# Patient Record
Sex: FEMALE | Race: WHITE | NOT HISPANIC OR LATINO | URBAN - NONMETROPOLITAN AREA
[De-identification: names, ages, dates, MRNs, and addresses within clinical notes are randomized per-mention and may not be internally consistent; named-entity substitution may affect disease eponyms.]

---

## 2024-04-23 ENCOUNTER — APPOINTMENT (OUTPATIENT)
Dept: PRIMARY CARE | Facility: CLINIC | Age: 49
End: 2024-04-23
Payer: COMMERCIAL

## 2024-06-04 ENCOUNTER — OFFICE VISIT (OUTPATIENT)
Dept: PRIMARY CARE | Facility: CLINIC | Age: 49
End: 2024-06-04
Payer: COMMERCIAL

## 2024-06-04 VITALS
BODY MASS INDEX: 33.61 KG/M2 | HEIGHT: 60 IN | OXYGEN SATURATION: 96 % | WEIGHT: 171.2 LBS | TEMPERATURE: 98.2 F | HEART RATE: 58 BPM | DIASTOLIC BLOOD PRESSURE: 98 MMHG | SYSTOLIC BLOOD PRESSURE: 148 MMHG

## 2024-06-04 DIAGNOSIS — Z12.31 ENCOUNTER FOR SCREENING MAMMOGRAM FOR MALIGNANT NEOPLASM OF BREAST: ICD-10-CM

## 2024-06-04 DIAGNOSIS — R53.83 TIRED: ICD-10-CM

## 2024-06-04 DIAGNOSIS — Z13.6 SCREENING FOR HEART DISEASE: ICD-10-CM

## 2024-06-04 DIAGNOSIS — Z00.00 PHYSICAL EXAM: Primary | ICD-10-CM

## 2024-06-04 DIAGNOSIS — B35.1 NAIL FUNGUS: ICD-10-CM

## 2024-06-04 DIAGNOSIS — M25.532 BILATERAL WRIST PAIN: ICD-10-CM

## 2024-06-04 DIAGNOSIS — M25.531 BILATERAL WRIST PAIN: ICD-10-CM

## 2024-06-04 DIAGNOSIS — R03.0 ELEVATED BLOOD PRESSURE READING: ICD-10-CM

## 2024-06-04 PROCEDURE — 99396 PREV VISIT EST AGE 40-64: CPT | Performed by: FAMILY MEDICINE

## 2024-06-04 RX ORDER — MINERAL OIL
180 ENEMA (ML) RECTAL DAILY
COMMUNITY

## 2024-06-04 RX ORDER — FLUCONAZOLE 100 MG/1
300 TABLET ORAL
Qty: 12 TABLET | Refills: 2 | Status: SHIPPED | OUTPATIENT
Start: 2024-06-09

## 2024-06-04 ASSESSMENT — PAIN SCALES - GENERAL: PAINLEVEL: 4

## 2024-06-04 ASSESSMENT — PATIENT HEALTH QUESTIONNAIRE - PHQ9
1. LITTLE INTEREST OR PLEASURE IN DOING THINGS: NOT AT ALL
SUM OF ALL RESPONSES TO PHQ9 QUESTIONS 1 AND 2: 0
2. FEELING DOWN, DEPRESSED OR HOPELESS: NOT AT ALL

## 2024-06-04 NOTE — PROGRESS NOTES
"Subjective   Patient ID: Dorie Decker is a 49 y.o. female who presents for Annual Exam, Breast Cancer Screening, and Colonoscopy (Discuss ).    Patient is here for physical exam.  She does report being tired at times, she also has some toenail fungus.  Due for mammogram, due for basic lab work.  Her blood pressure is elevated today.  She does have bilateral wrist pain.  We will have her sleep with splints bilaterally to see if this helps with the bilateral wrist pain         Review of Systems   Constitutional:  Negative for fever.        Also see HPI   Eyes:  Negative for visual disturbance.   Respiratory:  Negative for shortness of breath.    Cardiovascular:  Negative for chest pain.   Gastrointestinal:  Negative for diarrhea and nausea.   Endocrine: Negative.    Genitourinary:  Negative for difficulty urinating.   Skin:  Negative for rash.   Neurological:  Negative for dizziness.        No focal deficits   Psychiatric/Behavioral:  Negative for suicidal ideas.    All other systems reviewed and are negative.      Objective   BP (!) 148/98   Pulse 58   Temp 36.8 °C (98.2 °F)   Ht 1.511 m (4' 11.5\")   Wt 77.7 kg (171 lb 3.2 oz)   SpO2 96%   BMI 34.00 kg/m²     Physical Exam  Vitals and nursing note reviewed.   Constitutional:       Appearance: Normal appearance.   HENT:      Head: Normocephalic and atraumatic.   Eyes:      Extraocular Movements: Extraocular movements intact.      Conjunctiva/sclera: Conjunctivae normal.   Cardiovascular:      Rate and Rhythm: Normal rate and regular rhythm.      Heart sounds: Normal heart sounds.   Pulmonary:      Effort: Pulmonary effort is normal.      Breath sounds: Normal breath sounds.      Comments: Lungs essentially CTA b/l  Abdominal:      General: There is no distension.      Palpations: Abdomen is soft. There is no mass.      Tenderness: There is no abdominal tenderness.   Musculoskeletal:      Right lower leg: No edema.      Left lower leg: No edema.   Skin:     " Coloration: Skin is not jaundiced.      Findings: No rash.   Neurological:      General: No focal deficit present.      Mental Status: She is alert and oriented to person, place, and time.   Psychiatric:         Mood and Affect: Mood normal.         Behavior: Behavior normal.         Thought Content: Thought content normal.         Judgment: Judgment normal.         Assessment/Plan   Diagnoses and all orders for this visit:  Physical exam  -     Comprehensive Metabolic Panel; Future  -     TSH with reflex to Free T4 if abnormal; Future  -     Uric Acid; Future  -     Lipid Panel; Future  -     Albumin , Urine Random; Future  -     CBC and Auto Differential; Future  -     Vitamin B12; Future  Encounter for screening mammogram for malignant neoplasm of breast  -     BI mammo bilateral screening tomosynthesis; Future  Tired  -     Comprehensive Metabolic Panel; Future  -     TSH with reflex to Free T4 if abnormal; Future  -     CBC and Auto Differential; Future  -     Vitamin B12; Future  Bilateral wrist pain  Elevated blood pressure reading  -     Comprehensive Metabolic Panel; Future  -     TSH with reflex to Free T4 if abnormal; Future  -     Uric Acid; Future  -     Lipid Panel; Future  -     Albumin , Urine Random; Future  -     CBC and Auto Differential; Future  Screening for heart disease  -     Lipid Panel; Future  Nail fungus  -     fluconazole (Diflucan) 100 mg tablet; Take 3 tablets (300 mg) by mouth 1 (one) time per week.

## 2024-06-07 ENCOUNTER — LAB (OUTPATIENT)
Dept: LAB | Facility: LAB | Age: 49
End: 2024-06-07
Payer: COMMERCIAL

## 2024-06-07 DIAGNOSIS — Z00.00 PHYSICAL EXAM: ICD-10-CM

## 2024-06-07 DIAGNOSIS — R53.83 TIRED: ICD-10-CM

## 2024-06-07 DIAGNOSIS — R03.0 ELEVATED BLOOD PRESSURE READING: ICD-10-CM

## 2024-06-07 DIAGNOSIS — Z13.6 SCREENING FOR HEART DISEASE: ICD-10-CM

## 2024-06-07 LAB
ALBUMIN SERPL BCP-MCNC: 4.8 G/DL (ref 3.4–5)
ALP SERPL-CCNC: 78 U/L (ref 33–110)
ALT SERPL W P-5'-P-CCNC: 19 U/L (ref 7–45)
ANION GAP SERPL CALC-SCNC: 13 MMOL/L (ref 10–20)
AST SERPL W P-5'-P-CCNC: 19 U/L (ref 9–39)
BASOPHILS # BLD AUTO: 0.03 X10*3/UL (ref 0–0.1)
BASOPHILS NFR BLD AUTO: 0.4 %
BILIRUB SERPL-MCNC: 0.5 MG/DL (ref 0–1.2)
BUN SERPL-MCNC: 19 MG/DL (ref 6–23)
CALCIUM SERPL-MCNC: 10.2 MG/DL (ref 8.6–10.3)
CHLORIDE SERPL-SCNC: 104 MMOL/L (ref 98–107)
CHOLEST SERPL-MCNC: 234 MG/DL (ref 0–199)
CHOLESTEROL/HDL RATIO: 5.1
CO2 SERPL-SCNC: 27 MMOL/L (ref 21–32)
CREAT SERPL-MCNC: 0.81 MG/DL (ref 0.5–1.05)
CREAT UR-MCNC: 89.1 MG/DL (ref 20–320)
EGFRCR SERPLBLD CKD-EPI 2021: 89 ML/MIN/1.73M*2
EOSINOPHIL # BLD AUTO: 0.03 X10*3/UL (ref 0–0.7)
EOSINOPHIL NFR BLD AUTO: 0.4 %
ERYTHROCYTE [DISTWIDTH] IN BLOOD BY AUTOMATED COUNT: 13.3 % (ref 11.5–14.5)
GLUCOSE SERPL-MCNC: 96 MG/DL (ref 74–99)
HCT VFR BLD AUTO: 44.6 % (ref 36–46)
HDLC SERPL-MCNC: 45.5 MG/DL
HGB BLD-MCNC: 14.4 G/DL (ref 12–16)
IMM GRANULOCYTES # BLD AUTO: 0.02 X10*3/UL (ref 0–0.7)
IMM GRANULOCYTES NFR BLD AUTO: 0.3 % (ref 0–0.9)
LDLC SERPL CALC-MCNC: 161 MG/DL
LYMPHOCYTES # BLD AUTO: 3.08 X10*3/UL (ref 1.2–4.8)
LYMPHOCYTES NFR BLD AUTO: 46 %
MCH RBC QN AUTO: 27.3 PG (ref 26–34)
MCHC RBC AUTO-ENTMCNC: 32.3 G/DL (ref 32–36)
MCV RBC AUTO: 85 FL (ref 80–100)
MICROALBUMIN UR-MCNC: <7 MG/L
MICROALBUMIN/CREAT UR: NORMAL MG/G{CREAT}
MONOCYTES # BLD AUTO: 0.42 X10*3/UL (ref 0.1–1)
MONOCYTES NFR BLD AUTO: 6.3 %
NEUTROPHILS # BLD AUTO: 3.11 X10*3/UL (ref 1.2–7.7)
NEUTROPHILS NFR BLD AUTO: 46.6 %
NON HDL CHOLESTEROL: 189 MG/DL (ref 0–149)
NRBC BLD-RTO: 0 /100 WBCS (ref 0–0)
PLATELET # BLD AUTO: 264 X10*3/UL (ref 150–450)
POTASSIUM SERPL-SCNC: 4.7 MMOL/L (ref 3.5–5.3)
PROT SERPL-MCNC: 7.3 G/DL (ref 6.4–8.2)
RBC # BLD AUTO: 5.27 X10*6/UL (ref 4–5.2)
SODIUM SERPL-SCNC: 139 MMOL/L (ref 136–145)
TRIGL SERPL-MCNC: 140 MG/DL (ref 0–149)
TSH SERPL-ACNC: 2.5 MIU/L (ref 0.44–3.98)
URATE SERPL-MCNC: 7.1 MG/DL (ref 2.3–6.7)
VIT B12 SERPL-MCNC: 416 PG/ML (ref 211–911)
VLDL: 28 MG/DL (ref 0–40)
WBC # BLD AUTO: 6.7 X10*3/UL (ref 4.4–11.3)

## 2024-06-07 PROCEDURE — 36415 COLL VENOUS BLD VENIPUNCTURE: CPT

## 2024-06-07 PROCEDURE — 82043 UR ALBUMIN QUANTITATIVE: CPT

## 2024-06-07 PROCEDURE — 82607 VITAMIN B-12: CPT

## 2024-06-07 PROCEDURE — 82570 ASSAY OF URINE CREATININE: CPT

## 2024-07-10 ASSESSMENT — ENCOUNTER SYMPTOMS
DIARRHEA: 0
DIFFICULTY URINATING: 0
DIZZINESS: 0
NAUSEA: 0
ENDOCRINE NEGATIVE: 1
SHORTNESS OF BREATH: 0
FEVER: 0

## 2024-07-16 ENCOUNTER — OFFICE VISIT (OUTPATIENT)
Dept: PRIMARY CARE | Facility: CLINIC | Age: 49
End: 2024-07-16
Payer: COMMERCIAL

## 2024-07-16 VITALS
OXYGEN SATURATION: 98 % | SYSTOLIC BLOOD PRESSURE: 122 MMHG | BODY MASS INDEX: 33.41 KG/M2 | WEIGHT: 170.2 LBS | HEIGHT: 60 IN | TEMPERATURE: 98.4 F | DIASTOLIC BLOOD PRESSURE: 88 MMHG | HEART RATE: 70 BPM

## 2024-07-16 DIAGNOSIS — E78.2 MIXED HYPERLIPIDEMIA: ICD-10-CM

## 2024-07-16 DIAGNOSIS — R03.0 ELEVATED BLOOD PRESSURE READING: ICD-10-CM

## 2024-07-16 DIAGNOSIS — H57.89 IRRITATION OF BOTH EYES: Primary | ICD-10-CM

## 2024-07-16 PROCEDURE — 99214 OFFICE O/P EST MOD 30 MIN: CPT | Performed by: FAMILY MEDICINE

## 2024-07-16 PROCEDURE — 1036F TOBACCO NON-USER: CPT | Performed by: FAMILY MEDICINE

## 2024-07-16 RX ORDER — POLYMYXIN B SULFATE AND TRIMETHOPRIM 1; 10000 MG/ML; [USP'U]/ML
1 SOLUTION OPHTHALMIC 3 TIMES DAILY
Qty: 10 ML | Refills: 0 | Status: SHIPPED | OUTPATIENT
Start: 2024-07-16 | End: 2024-07-23

## 2024-07-16 ASSESSMENT — PAIN SCALES - GENERAL: PAINLEVEL: 0-NO PAIN

## 2024-07-16 ASSESSMENT — ENCOUNTER SYMPTOMS
BLURRED VISION: 0
NECK PAIN: 0
PND: 0
PALPITATIONS: 0
HEADACHES: 0
SWEATS: 1
SHORTNESS OF BREATH: 0
HYPERTENSION: 1
ORTHOPNEA: 0

## 2024-07-16 ASSESSMENT — PATIENT HEALTH QUESTIONNAIRE - PHQ9
SUM OF ALL RESPONSES TO PHQ9 QUESTIONS 1 AND 2: 0
2. FEELING DOWN, DEPRESSED OR HOPELESS: NOT AT ALL
1. LITTLE INTEREST OR PLEASURE IN DOING THINGS: NOT AT ALL

## 2024-07-16 NOTE — PROGRESS NOTES
"Subjective   Patient ID: Dorie Decker is a 49 y.o. female who presents for Hypertension and Eye Problem (B/l eyes red and itchy).    Elevated blood pressure-stable, discussed DASH    Pt with eye irritation b/l  Elevated LDL-discussed diet    Hypertension  This is a new problem. The current episode started 1 to 4 weeks ago. The problem has been gradually improving since onset. The problem is controlled. Associated symptoms include malaise/fatigue and sweats. Pertinent negatives include no anxiety, blurred vision, chest pain, headaches, neck pain, orthopnea, palpitations, peripheral edema, PND or shortness of breath. Risk factors for coronary artery disease include dyslipidemia, family history, obesity, post-menopausal state and stress. Compliance problems include diet and exercise.    Eye Problem   Pertinent negatives include no blurred vision.        Review of Systems   Constitutional:  Positive for malaise/fatigue.   Eyes:  Negative for blurred vision.   Respiratory:  Negative for shortness of breath.    Cardiovascular:  Negative for chest pain, palpitations, orthopnea and PND.   Musculoskeletal:  Negative for neck pain.   Neurological:  Negative for headaches.       Objective   /88   Pulse 70   Temp 36.9 °C (98.4 °F)   Ht 1.511 m (4' 11.5\")   Wt 77.2 kg (170 lb 3.2 oz)   SpO2 98%   BMI 33.80 kg/m²     Physical Exam  Vitals and nursing note reviewed.   Constitutional:       Appearance: Normal appearance.   HENT:      Head: Normocephalic and atraumatic.   Eyes:      Extraocular Movements: Extraocular movements intact.      Comments: Red eyes b/l   Cardiovascular:      Rate and Rhythm: Normal rate and regular rhythm.      Heart sounds: Normal heart sounds.   Pulmonary:      Effort: Pulmonary effort is normal.      Breath sounds: Normal breath sounds.      Comments: Lungs essentially CTA b/l  Abdominal:      General: There is no distension.      Palpations: Abdomen is soft. There is no mass.      " Tenderness: There is no abdominal tenderness.   Musculoskeletal:      Right lower leg: No edema.      Left lower leg: No edema.   Skin:     Coloration: Skin is not jaundiced.      Findings: No rash.   Neurological:      General: No focal deficit present.      Mental Status: She is alert and oriented to person, place, and time.   Psychiatric:         Mood and Affect: Mood normal.         Behavior: Behavior normal.         Thought Content: Thought content normal.         Judgment: Judgment normal.         Assessment/Plan   Diagnoses and all orders for this visit:  Irritation of both eyes  -     polymyxin B sulf-trimethoprim (Polytrim) ophthalmic solution; Administer 1 drop into both eyes 3 times a day for 7 days.  Elevated blood pressure reading  Mixed hyperlipidemia

## 2024-07-16 NOTE — LETTER
July 16, 2024     Patient: Dorie Decker   YOB: 1975   Date of Visit: 7/16/2024       To Whom It May Concern:    Dorie Decker was seen in my clinic on 7/16/2024. Please excuse Dorie for her absence from work on this day to make the appointment.    If you have any questions or concerns, please don't hesitate to call.         Sincerely,         Kashif Ramirez,         CC: No Recipients

## 2025-01-07 ENCOUNTER — OFFICE VISIT (OUTPATIENT)
Dept: PRIMARY CARE | Facility: CLINIC | Age: 50
End: 2025-01-07
Payer: MEDICARE

## 2025-01-07 VITALS
DIASTOLIC BLOOD PRESSURE: 90 MMHG | OXYGEN SATURATION: 96 % | SYSTOLIC BLOOD PRESSURE: 130 MMHG | WEIGHT: 174.2 LBS | HEIGHT: 59 IN | TEMPERATURE: 98.2 F | HEART RATE: 83 BPM | BODY MASS INDEX: 35.12 KG/M2

## 2025-01-07 DIAGNOSIS — I10 PRIMARY HYPERTENSION: Primary | ICD-10-CM

## 2025-01-07 DIAGNOSIS — J01.00 ACUTE NON-RECURRENT MAXILLARY SINUSITIS: ICD-10-CM

## 2025-01-07 DIAGNOSIS — H10.32 ACUTE BACTERIAL CONJUNCTIVITIS OF LEFT EYE: ICD-10-CM

## 2025-01-07 PROCEDURE — 3080F DIAST BP >= 90 MM HG: CPT | Performed by: FAMILY MEDICINE

## 2025-01-07 PROCEDURE — 3008F BODY MASS INDEX DOCD: CPT | Performed by: FAMILY MEDICINE

## 2025-01-07 PROCEDURE — 3075F SYST BP GE 130 - 139MM HG: CPT | Performed by: FAMILY MEDICINE

## 2025-01-07 PROCEDURE — 99214 OFFICE O/P EST MOD 30 MIN: CPT | Performed by: FAMILY MEDICINE

## 2025-01-07 PROCEDURE — 1036F TOBACCO NON-USER: CPT | Performed by: FAMILY MEDICINE

## 2025-01-07 RX ORDER — AZITHROMYCIN 500 MG/1
500 TABLET, FILM COATED ORAL DAILY
Qty: 5 TABLET | Refills: 0 | Status: SHIPPED | OUTPATIENT
Start: 2025-01-07 | End: 2025-01-12

## 2025-01-07 RX ORDER — LISINOPRIL 10 MG/1
10 TABLET ORAL DAILY
Qty: 100 TABLET | Refills: 3 | Status: SHIPPED | OUTPATIENT
Start: 2025-01-07 | End: 2026-02-11

## 2025-01-07 RX ORDER — TOBRAMYCIN 3 MG/ML
1 SOLUTION/ DROPS OPHTHALMIC 3 TIMES DAILY
Qty: 5 ML | Refills: 0 | Status: SHIPPED | OUTPATIENT
Start: 2025-01-07 | End: 2025-01-14

## 2025-01-07 ASSESSMENT — PATIENT HEALTH QUESTIONNAIRE - PHQ9
1. LITTLE INTEREST OR PLEASURE IN DOING THINGS: NOT AT ALL
2. FEELING DOWN, DEPRESSED OR HOPELESS: NOT AT ALL
SUM OF ALL RESPONSES TO PHQ9 QUESTIONS 1 AND 2: 0

## 2025-01-07 ASSESSMENT — ENCOUNTER SYMPTOMS
LOSS OF SENSATION IN FEET: 0
NAUSEA: 0
DIARRHEA: 0
OCCASIONAL FEELINGS OF UNSTEADINESS: 0
SHORTNESS OF BREATH: 0
DIZZINESS: 0
ENDOCRINE NEGATIVE: 1
FEVER: 0
SINUS PAIN: 1
DIFFICULTY URINATING: 0
SINUS PRESSURE: 1
RHINORRHEA: 1
DEPRESSION: 0

## 2025-01-07 ASSESSMENT — PAIN SCALES - GENERAL: PAINLEVEL_OUTOF10: 3

## 2025-01-07 NOTE — LETTER
January 7, 2025     Patient: Dorie Decker   YOB: 1975   Date of Visit: 1/7/2025       To Whom It May Concern:    Dorie Decker was seen in my clinic on 1/7/2025 at 7:45 am. Please excuse Dorie for her absence from work on this day to make the appointment.    If you have any questions or concerns, please don't hesitate to call.         Sincerely,         Kashif Ramirez,         CC: No Recipients

## 2025-01-07 NOTE — PROGRESS NOTES
"Subjective   Patient ID: Dorie Decker is a 49 y.o. female who presents for Hypertension, Sore Throat, and left eye sore pressure.    Elevated blood pressure readings-HTN  Allergic rhinitis  Redness of left eye-daughter with \"pinkeye\" and +strep throat  Sinus symptoms over the last week         Review of Systems   Constitutional:  Negative for fever.        Also see HPI   HENT:  Positive for congestion, postnasal drip, rhinorrhea, sinus pressure and sinus pain.    Eyes:  Negative for visual disturbance.   Respiratory:  Negative for shortness of breath.    Cardiovascular:  Negative for chest pain.   Gastrointestinal:  Negative for diarrhea and nausea.   Endocrine: Negative.    Genitourinary:  Negative for difficulty urinating.   Skin:  Negative for rash.   Neurological:  Negative for dizziness.        No focal deficits   Psychiatric/Behavioral:  Negative for suicidal ideas.    All other systems reviewed and are negative.      Objective   /90   Pulse 83   Temp 36.8 °C (98.2 °F)   Ht 1.499 m (4' 11\")   Wt 79 kg (174 lb 3.2 oz)   SpO2 96%   BMI 35.18 kg/m²     Physical Exam  Vitals and nursing note reviewed.   Constitutional:       Appearance: Normal appearance.   HENT:      Head: Normocephalic and atraumatic.      Comments: Maxillary sinus tenderness with palpation     Mouth/Throat:      Mouth: Mucous membranes are moist.      Comments: Cobblestoning noted  Airway patent  Eyes:      General:         Left eye: Discharge present.     Extraocular Movements: Extraocular movements intact.      Pupils: Pupils are equal, round, and reactive to light.      Comments: Left eye redness   Cardiovascular:      Rate and Rhythm: Normal rate and regular rhythm.      Heart sounds: Normal heart sounds.   Pulmonary:      Effort: Pulmonary effort is normal.      Breath sounds: Normal breath sounds.   Neurological:      Mental Status: She is oriented to person, place, and time.   Psychiatric:         Mood and Affect: Mood " normal.         Behavior: Behavior normal.         Assessment/Plan   Diagnoses and all orders for this visit:  Primary hypertension  -     lisinopril 10 mg tablet; Take 1 tablet (10 mg) by mouth once daily.  -     Follow Up In Primary Care - Established; Future  Acute bacterial conjunctivitis of left eye  -     tobramycin (Tobrex) 0.3 % ophthalmic solution; Administer 1 drop into both eyes 3 times a day for 7 days.  Acute non-recurrent maxillary sinusitis  -     azithromycin (Zithromax) 500 mg tablet; Take 1 tablet (500 mg) by mouth once daily for 5 days.

## 2025-04-07 ENCOUNTER — OFFICE VISIT (OUTPATIENT)
Dept: PRIMARY CARE | Facility: CLINIC | Age: 50
End: 2025-04-07
Payer: MEDICARE

## 2025-04-07 VITALS
HEART RATE: 68 BPM | BODY MASS INDEX: 34.27 KG/M2 | OXYGEN SATURATION: 99 % | HEIGHT: 59 IN | DIASTOLIC BLOOD PRESSURE: 88 MMHG | SYSTOLIC BLOOD PRESSURE: 130 MMHG | TEMPERATURE: 97.9 F | WEIGHT: 170 LBS

## 2025-04-07 DIAGNOSIS — Z12.11 SCREENING FOR COLON CANCER: ICD-10-CM

## 2025-04-07 DIAGNOSIS — I10 PRIMARY HYPERTENSION: Primary | ICD-10-CM

## 2025-04-07 PROCEDURE — 3079F DIAST BP 80-89 MM HG: CPT | Performed by: FAMILY MEDICINE

## 2025-04-07 PROCEDURE — 3008F BODY MASS INDEX DOCD: CPT | Performed by: FAMILY MEDICINE

## 2025-04-07 PROCEDURE — 3075F SYST BP GE 130 - 139MM HG: CPT | Performed by: FAMILY MEDICINE

## 2025-04-07 PROCEDURE — 99213 OFFICE O/P EST LOW 20 MIN: CPT | Performed by: FAMILY MEDICINE

## 2025-04-07 RX ORDER — LISINOPRIL 20 MG/1
20 TABLET ORAL DAILY
Qty: 90 TABLET | Refills: 3 | Status: SHIPPED | OUTPATIENT
Start: 2025-04-07

## 2025-04-07 ASSESSMENT — PATIENT HEALTH QUESTIONNAIRE - PHQ9
2. FEELING DOWN, DEPRESSED OR HOPELESS: NOT AT ALL
SUM OF ALL RESPONSES TO PHQ9 QUESTIONS 1 AND 2: 0
1. LITTLE INTEREST OR PLEASURE IN DOING THINGS: NOT AT ALL

## 2025-04-07 ASSESSMENT — ENCOUNTER SYMPTOMS
PND: 0
ORTHOPNEA: 0
BLURRED VISION: 0
SWEATS: 0
HYPERTENSION: 1
HEADACHES: 0
NECK PAIN: 0
NAUSEA: 0
PALPITATIONS: 0
VOMITING: 0
SHORTNESS OF BREATH: 0

## 2025-04-07 ASSESSMENT — PAIN SCALES - GENERAL: PAINLEVEL_OUTOF10: 0-NO PAIN

## 2025-04-07 NOTE — PROGRESS NOTES
"Subjective   Patient ID: Dorie Decker is a 49 y.o. female who presents for Follow-up (Hypertension//).    Last clinic visit summary (1/7/2025)  -Hypertension: Pt had elevated BP readings in clinic today. Denies any chest pain and/or SOB. Pt received prescription for lisinopril 10 mg (daily) to better control BP. Will be re-evaluated in 3 months.  -Redness of left eye/Sinus symptoms: Pt states that her daughter had been suffering from \"pink eye\" and strep throat recently. Now, pt endorses redness in her left eye. Also has been experiencing sinus symptoms for last week. As such, pt received prescription for Zithromax medication and tobramycin ophthalmic solution to treat these symptoms.    Hypertension  This is a recurrent problem. The current episode started more than 1 month ago. The problem has been waxing and waning since onset. The problem is resistant. Pertinent negatives include no anxiety, blurred vision, chest pain, headaches, malaise/fatigue, neck pain, orthopnea, palpitations, peripheral edema, PND, shortness of breath or sweats. There are no associated agents to hypertension. Risk factors for coronary artery disease include dyslipidemia, family history, obesity, post-menopausal state and stress. Compliance problems include diet and exercise.       The pt presents to the clinic for a 3-month follow-up (HTN). She has been doing well since her last visit, she denies chest pain of shortness of breath. She reports episodes of hot flashes occasionally but feels that this is due to menopause.    Review of Systems   Constitutional:  Negative for malaise/fatigue.   Eyes:  Negative for blurred vision.   Respiratory:  Negative for shortness of breath.    Cardiovascular:  Negative for chest pain, palpitations, orthopnea and PND.   Gastrointestinal:  Negative for nausea and vomiting.   Musculoskeletal:  Negative for neck pain.   Neurological:  Negative for headaches.       Objective   /88   Pulse 68   Temp " "36.6 °C (97.9 °F)   Ht 1.499 m (4' 11\")   Wt 77.1 kg (170 lb)   SpO2 99%   BMI 34.34 kg/m²     Physical Exam  Constitutional:       Appearance: Normal appearance.   HENT:      Head: Normocephalic and atraumatic.   Cardiovascular:      Rate and Rhythm: Normal rate and regular rhythm.      Pulses: Normal pulses.      Heart sounds: Normal heart sounds.   Pulmonary:      Effort: Pulmonary effort is normal.      Breath sounds: Normal breath sounds. Stridor present.   Neurological:      Mental Status: She is alert.   Psychiatric:         Mood and Affect: Mood normal.         Behavior: Behavior normal.         Assessment/Plan   Dorie was seen today for follow-up.  Diagnoses and all orders for this visit:  Screening for colon cancer (Primary)  Primary hypertension  -     Follow Up In Primary Care - Established  -     lisinopril 20 mg tablet; Take 1 tablet (20 mg) by mouth once daily.    Scribe Attestation  By signing my name below, IMuriel Scribe   attest that this documentation has been prepared under the direction and in the presence of Kashif Ramirez DO.  "

## 2025-04-07 NOTE — LETTER
April 7, 2025     Patient: Dorie Decker   YOB: 1975   Date of Visit: 4/7/2025       To Whom It May Concern:    Dorie Decker was seen in my clinic on 4/7/2025 at 7:45 am. Please excuse Dorie for her absence from work on this day to make the appointment.    If you have any questions or concerns, please don't hesitate to call.         Sincerely,         Kashif Ramirez, DO

## 2025-06-06 ENCOUNTER — OFFICE VISIT (OUTPATIENT)
Dept: URGENT CARE | Facility: URGENT CARE | Age: 50
End: 2025-06-06
Payer: MEDICARE

## 2025-06-06 VITALS
HEART RATE: 72 BPM | DIASTOLIC BLOOD PRESSURE: 80 MMHG | OXYGEN SATURATION: 95 % | WEIGHT: 170 LBS | TEMPERATURE: 98.2 F | RESPIRATION RATE: 16 BRPM | BODY MASS INDEX: 34.34 KG/M2 | SYSTOLIC BLOOD PRESSURE: 117 MMHG

## 2025-06-06 DIAGNOSIS — H57.89 EYE IRRITATION: Primary | ICD-10-CM

## 2025-06-06 RX ORDER — POLYMYXIN B SULFATE AND TRIMETHOPRIM 1; 10000 MG/ML; [USP'U]/ML
1 SOLUTION OPHTHALMIC EVERY 4 HOURS
Qty: 10 ML | Refills: 0 | Status: SHIPPED | OUTPATIENT
Start: 2025-06-06 | End: 2025-06-16

## 2025-06-06 ASSESSMENT — ENCOUNTER SYMPTOMS
BLURRED VISION: 0
EYE ITCHING: 0
EYE DISCHARGE: 0
PHOTOPHOBIA: 0
NAUSEA: 0
CRUSTING: 0
EYE WATERING: 1
DOUBLE VISION: 0
EYE REDNESS: 1
HEADACHES: 0
PERI-ORBITAL EDEMA: 0
EYE INFLAMMATION: 1

## 2025-06-06 ASSESSMENT — VISUAL ACUITY: OU: 1

## 2025-06-06 NOTE — PROGRESS NOTES
Subjective   Patient ID: Dorie Decker is a 50 y.o. female. They present today with a chief complaint of Eye Problem (Left eye red ).    History of Present Illness    History provided by:  Patient  Eye Problem  Location:  Left eye  Quality:  Aching and tearing  Severity:  Moderate  Onset quality:  Gradual  Duration:  5 hours  Timing:  Constant  Progression:  Worsening  Chronicity:  New  Relieved by:  Nothing  Worsened by:  Nothing  Ineffective treatments:  None tried  Associated symptoms: inflammation, redness and tearing    Associated symptoms: no blurred vision, no crusting, no decreased vision, no discharge, no double vision, no headaches, no itching, no nausea, no photophobia and no swelling        Past Medical History  Allergies as of 06/06/2025 - Reviewed 06/06/2025   Allergen Reaction Noted    Sulfa (sulfonamide antibiotics) Hives and Swelling 09/30/2010    Pollen extracts Other 06/04/2024       Prescriptions Prior to Admission[1]     Medical History[2]    Surgical History[3]     reports that she quit smoking about 7 years ago. Her smoking use included cigarettes. She started smoking about 31 years ago. She has a 24.3 pack-year smoking history. She has never used smokeless tobacco. She reports that she does not currently use alcohol. She reports that she does not use drugs.    Review of Systems  Review of Systems   Eyes:  Positive for redness. Negative for blurred vision, double vision, photophobia, discharge and itching.   Gastrointestinal:  Negative for nausea.   Neurological:  Negative for headaches.   All other systems reviewed and are negative.                                 Objective    Vitals:    06/06/25 1316   BP: 117/80   Pulse: 72   Resp: 16   Temp: 36.8 °C (98.2 °F)   TempSrc: Oral   SpO2: 95%   Weight: 77.1 kg (170 lb)     No LMP recorded. Patient is postmenopausal.    Physical Exam  Vitals and nursing note reviewed.   Eyes:      General: Lids are normal. Lids are everted, no foreign bodies  appreciated. Vision grossly intact. Gaze aligned appropriately.      Conjunctiva/sclera:      Left eye: Left conjunctiva is injected.      Comments: Excessive tearing         Procedures    Point of Care Test & Imaging Results from this visit  No results found for this visit on 06/06/25.   Imaging  No results found.    Cardiology, Vascular, and Other Imaging  No other imaging results found for the past 2 days      Diagnostic study results (if any) were reviewed by Crystal L Severino, APRN-CNP.    Assessment/Plan   Allergies, medications, history, and pertinent labs/EKGs/Imaging reviewed by Crystal L Severino, APRN-CNP.     Medical Decision Making  Eye exam as above, states she has not been outside a lot or doing any work that would cause a FB.  She states she worked 3rd shift last night and has not slept yet.  Assessment is consistent with left eye irritation, she will be treated with eye drops and is to follow up with her pcp or eye doctor if symptoms persist or worsen.  At time of discharge patient was clinically well-appearing and HDS for outpatient management. The patient and/or family was educated regarding diagnosis, supportive care, OTC and Rx medications. The patient and/or family was given the opportunity to ask questions prior to discharge.  They verbalized understanding of my discussion of the plans for treatment, expected course, indications to return to  or seek further evaluation in ED, and the need for timely follow up as directed.   They were provided with a work/school excuse if requested.      Orders and Diagnoses  Diagnoses and all orders for this visit:  Eye irritation  -     polymyxin B sulf-trimethoprim (Polytrim) ophthalmic solution; Administer 1 drop into both eyes every 4 hours for 10 days.  Use drops as directed  Warm compresses to the eye  Tylenol/ibuprofen as needed      Medical Admin Record      Patient disposition: Home    Electronically signed by Crystal L Severino, APRN-CNP  1:27  PM           [1] (Not in a hospital admission)  [2]   Past Medical History:  Diagnosis Date    Allergic Sulfa    Anxiety    [3]   Past Surgical History:  Procedure Laterality Date     SECTION, LOW TRANSVERSE      CHOLECYSTECTOMY      TUBAL LIGATION

## 2025-06-07 ENCOUNTER — TELEPHONE (OUTPATIENT)
Dept: URGENT CARE | Facility: URGENT CARE | Age: 50
End: 2025-06-07

## 2025-07-07 ASSESSMENT — ENCOUNTER SYMPTOMS
SHORTNESS OF BREATH: 0
DIZZINESS: 0
FEVER: 0
DIFFICULTY URINATING: 0
ENDOCRINE NEGATIVE: 1
NAUSEA: 0
DIARRHEA: 0

## 2025-07-07 NOTE — PROGRESS NOTES
"Subjective   Patient ID: Dorie Decker is a 50 y.o. female who presents for 3 month follow up hypertension    HPI   Patient is presenting to the clinic for 6 month follow up hypertension.     -HTN: Controlled. Bp 110/80 today when checked in the clinic. Taking lisinopril 20 mg. Doing well with this medication. No side-effects reported. No chest pain and/or SOB. Will re-evaluate evolution in about a year.    Sinusitis  Due for mammogram screening order placed    Review of Systems   Constitutional:  Negative for fever.        Also see HPI   HENT:  Positive for congestion, postnasal drip, rhinorrhea, sinus pressure and sinus pain.    Eyes:  Negative for visual disturbance.   Respiratory:  Negative for shortness of breath.    Cardiovascular:  Negative for chest pain.   Gastrointestinal:  Negative for diarrhea and nausea.   Endocrine: Negative.    Genitourinary:  Negative for difficulty urinating.   Skin:  Negative for rash.   Neurological:  Negative for dizziness.        No focal deficits   Psychiatric/Behavioral:  Negative for suicidal ideas.    All other systems reviewed and are negative.      Objective   /80   Pulse 83   Temp 36.9 °C (98.4 °F)   Ht (!) 1.499 m (4' 11\")   Wt 78.7 kg (173 lb 9.6 oz)   SpO2 95%   BMI 35.06 kg/m²     Physical Exam  Vitals and nursing note reviewed.   Constitutional:       Appearance: Normal appearance.   HENT:      Head: Normocephalic and atraumatic.   Eyes:      Conjunctiva/sclera: Conjunctivae normal.   Cardiovascular:      Rate and Rhythm: Normal rate and regular rhythm.      Heart sounds: Normal heart sounds.   Pulmonary:      Effort: Pulmonary effort is normal.      Breath sounds: Normal breath sounds.   Neurological:      Mental Status: She is oriented to person, place, and time.   Psychiatric:         Mood and Affect: Mood normal.         Behavior: Behavior normal.         Assessment/Plan   Diagnoses and all orders for this visit:  Primary hypertension  Acute " non-recurrent maxillary sinusitis  -     azithromycin (Zithromax) 500 mg tablet; Take 1 tablet (500 mg) by mouth once daily for 5 days.  Encounter for screening mammogram for breast cancer  Hyperglycemia       Scribe Attestation  By signing my name below, IArturo Scribe   attest that this documentation has been prepared under the direction and in the presence of Kashif Ramirez DO.

## 2025-07-08 ENCOUNTER — APPOINTMENT (OUTPATIENT)
Dept: PRIMARY CARE | Facility: CLINIC | Age: 50
End: 2025-07-08
Payer: MEDICARE

## 2025-07-08 VITALS
HEIGHT: 59 IN | HEART RATE: 83 BPM | DIASTOLIC BLOOD PRESSURE: 80 MMHG | OXYGEN SATURATION: 95 % | TEMPERATURE: 98.4 F | SYSTOLIC BLOOD PRESSURE: 110 MMHG | BODY MASS INDEX: 35 KG/M2 | WEIGHT: 173.6 LBS

## 2025-07-08 DIAGNOSIS — Z12.31 ENCOUNTER FOR SCREENING MAMMOGRAM FOR BREAST CANCER: ICD-10-CM

## 2025-07-08 DIAGNOSIS — R73.9 HYPERGLYCEMIA: ICD-10-CM

## 2025-07-08 DIAGNOSIS — I10 PRIMARY HYPERTENSION: Primary | ICD-10-CM

## 2025-07-08 DIAGNOSIS — J01.00 ACUTE NON-RECURRENT MAXILLARY SINUSITIS: ICD-10-CM

## 2025-07-08 PROCEDURE — 3008F BODY MASS INDEX DOCD: CPT | Performed by: FAMILY MEDICINE

## 2025-07-08 PROCEDURE — 99213 OFFICE O/P EST LOW 20 MIN: CPT | Performed by: FAMILY MEDICINE

## 2025-07-08 PROCEDURE — 3074F SYST BP LT 130 MM HG: CPT | Performed by: FAMILY MEDICINE

## 2025-07-08 PROCEDURE — 3079F DIAST BP 80-89 MM HG: CPT | Performed by: FAMILY MEDICINE

## 2025-07-08 RX ORDER — AZITHROMYCIN 500 MG/1
500 TABLET, FILM COATED ORAL DAILY
Qty: 5 TABLET | Refills: 0 | Status: SHIPPED | OUTPATIENT
Start: 2025-07-08 | End: 2025-07-13

## 2025-07-08 ASSESSMENT — PATIENT HEALTH QUESTIONNAIRE - PHQ9
2. FEELING DOWN, DEPRESSED OR HOPELESS: NOT AT ALL
1. LITTLE INTEREST OR PLEASURE IN DOING THINGS: NOT AT ALL
SUM OF ALL RESPONSES TO PHQ9 QUESTIONS 1 AND 2: 0

## 2025-07-08 ASSESSMENT — ENCOUNTER SYMPTOMS
LOSS OF SENSATION IN FEET: 0
SINUS PAIN: 1
SINUS PRESSURE: 1
RHINORRHEA: 1
DEPRESSION: 0
OCCASIONAL FEELINGS OF UNSTEADINESS: 0

## 2025-07-08 ASSESSMENT — PAIN SCALES - GENERAL: PAINLEVEL_OUTOF10: 0-NO PAIN

## 2025-07-09 LAB
ALBUMIN SERPL-MCNC: 4.8 G/DL (ref 3.6–5.1)
ALBUMIN/CREAT UR: 2 MG/G CREAT
ALP SERPL-CCNC: 64 U/L (ref 37–153)
ALT SERPL-CCNC: 16 U/L (ref 6–29)
ANION GAP SERPL CALCULATED.4IONS-SCNC: 8 MMOL/L (CALC) (ref 7–17)
AST SERPL-CCNC: 17 U/L (ref 10–35)
BASOPHILS # BLD AUTO: 20 CELLS/UL (ref 0–200)
BASOPHILS NFR BLD AUTO: 0.3 %
BILIRUB SERPL-MCNC: 0.4 MG/DL (ref 0.2–1.2)
BUN SERPL-MCNC: 20 MG/DL (ref 7–25)
CALCIUM SERPL-MCNC: 9.7 MG/DL (ref 8.6–10.4)
CHLORIDE SERPL-SCNC: 106 MMOL/L (ref 98–110)
CHOLEST SERPL-MCNC: 236 MG/DL
CHOLEST/HDLC SERPL: 4.9 (CALC)
CO2 SERPL-SCNC: 24 MMOL/L (ref 20–32)
CREAT SERPL-MCNC: 0.77 MG/DL (ref 0.5–1.03)
CREAT UR-MCNC: 101 MG/DL (ref 20–275)
EGFRCR SERPLBLD CKD-EPI 2021: 94 ML/MIN/1.73M2
EOSINOPHIL # BLD AUTO: 40 CELLS/UL (ref 15–500)
EOSINOPHIL NFR BLD AUTO: 0.6 %
ERYTHROCYTE [DISTWIDTH] IN BLOOD BY AUTOMATED COUNT: 13.1 % (ref 11–15)
EST. AVERAGE GLUCOSE BLD GHB EST-MCNC: 126 MG/DL
EST. AVERAGE GLUCOSE BLD GHB EST-SCNC: 7 MMOL/L
GLUCOSE SERPL-MCNC: 103 MG/DL (ref 65–99)
HBA1C MFR BLD: 6 %
HCT VFR BLD AUTO: 43.1 % (ref 35–45)
HDLC SERPL-MCNC: 48 MG/DL
HGB BLD-MCNC: 13.5 G/DL (ref 11.7–15.5)
LDLC SERPL CALC-MCNC: 160 MG/DL (CALC)
LYMPHOCYTES # BLD AUTO: 3270 CELLS/UL (ref 850–3900)
LYMPHOCYTES NFR BLD AUTO: 48.8 %
MCH RBC QN AUTO: 26.4 PG (ref 27–33)
MCHC RBC AUTO-ENTMCNC: 31.3 G/DL (ref 32–36)
MCV RBC AUTO: 84.3 FL (ref 80–100)
MICROALBUMIN UR-MCNC: 0.2 MG/DL
MONOCYTES # BLD AUTO: 422 CELLS/UL (ref 200–950)
MONOCYTES NFR BLD AUTO: 6.3 %
NEUTROPHILS # BLD AUTO: 2948 CELLS/UL (ref 1500–7800)
NEUTROPHILS NFR BLD AUTO: 44 %
NONHDLC SERPL-MCNC: 188 MG/DL (CALC)
PLATELET # BLD AUTO: 263 THOUSAND/UL (ref 140–400)
PMV BLD REES-ECKER: 10.2 FL (ref 7.5–12.5)
POTASSIUM SERPL-SCNC: 4.6 MMOL/L (ref 3.5–5.3)
PROT SERPL-MCNC: 7.2 G/DL (ref 6.1–8.1)
RBC # BLD AUTO: 5.11 MILLION/UL (ref 3.8–5.1)
SODIUM SERPL-SCNC: 138 MMOL/L (ref 135–146)
TRIGL SERPL-MCNC: 152 MG/DL
TSH SERPL-ACNC: 1.46 MIU/L
URATE SERPL-MCNC: 7.4 MG/DL (ref 2.5–7)
WBC # BLD AUTO: 6.7 THOUSAND/UL (ref 3.8–10.8)

## 2025-07-29 ENCOUNTER — OFFICE VISIT (OUTPATIENT)
Dept: PRIMARY CARE | Facility: CLINIC | Age: 50
End: 2025-07-29
Payer: MEDICARE

## 2025-07-29 VITALS
HEART RATE: 70 BPM | DIASTOLIC BLOOD PRESSURE: 80 MMHG | HEIGHT: 59 IN | BODY MASS INDEX: 34.8 KG/M2 | SYSTOLIC BLOOD PRESSURE: 128 MMHG | OXYGEN SATURATION: 97 % | TEMPERATURE: 98.4 F | WEIGHT: 172.6 LBS

## 2025-07-29 DIAGNOSIS — M54.9 DORSALGIA: ICD-10-CM

## 2025-07-29 DIAGNOSIS — R10.32 LEFT LOWER QUADRANT ABDOMINAL PAIN: Primary | ICD-10-CM

## 2025-07-29 PROCEDURE — 99213 OFFICE O/P EST LOW 20 MIN: CPT | Performed by: FAMILY MEDICINE

## 2025-07-29 PROCEDURE — 3008F BODY MASS INDEX DOCD: CPT | Performed by: FAMILY MEDICINE

## 2025-07-29 RX ORDER — PREDNISONE 10 MG/1
TABLET ORAL
Qty: 21 TABLET | Refills: 0 | Status: SHIPPED | OUTPATIENT
Start: 2025-07-29 | End: 2025-08-04

## 2025-07-29 ASSESSMENT — ENCOUNTER SYMPTOMS
DIARRHEA: 0
SHORTNESS OF BREATH: 0
DIFFICULTY URINATING: 0
ABDOMINAL PAIN: 1
DIZZINESS: 0
FEVER: 0
NAUSEA: 0
ENDOCRINE NEGATIVE: 1

## 2025-07-29 ASSESSMENT — PAIN SCALES - GENERAL: PAINLEVEL_OUTOF10: 4

## 2025-07-29 NOTE — LETTER
July 29, 2025     Patient: Dorie Decker   YOB: 1975   Date of Visit: 7/29/2025       To Whom It May Concern:    Dorie Decker was seen in my clinic on 7/29/2025 at 12:30 pm. Please excuse Dorie for her absence from work on this day to make the appointment.    If you have any questions or concerns, please don't hesitate to call.         Sincerely,         Kashif Ramirez,         CC: No Recipients

## 2025-07-31 LAB
ALBUMIN SERPL-MCNC: 4.9 G/DL (ref 3.6–5.1)
ALP SERPL-CCNC: 67 U/L (ref 37–153)
ALT SERPL-CCNC: 17 U/L (ref 6–29)
AMYLASE SERPL-CCNC: 30 U/L (ref 21–101)
ANION GAP SERPL CALCULATED.4IONS-SCNC: 9 MMOL/L (CALC) (ref 7–17)
APPEARANCE UR: CLEAR
AST SERPL-CCNC: 18 U/L (ref 10–35)
BACTERIA #/AREA URNS HPF: ABNORMAL /HPF
BACTERIA UR CULT: ABNORMAL
BACTERIA UR CULT: ABNORMAL
BASOPHILS # BLD AUTO: 29 CELLS/UL (ref 0–200)
BASOPHILS NFR BLD AUTO: 0.4 %
BILIRUB SERPL-MCNC: 0.5 MG/DL (ref 0.2–1.2)
BILIRUB UR QL STRIP: NEGATIVE
BUN SERPL-MCNC: 15 MG/DL (ref 7–25)
CALCIUM SERPL-MCNC: 9.8 MG/DL (ref 8.6–10.4)
CHLORIDE SERPL-SCNC: 102 MMOL/L (ref 98–110)
CO2 SERPL-SCNC: 26 MMOL/L (ref 20–32)
COLOR UR: YELLOW
CREAT SERPL-MCNC: 0.78 MG/DL (ref 0.5–1.03)
EGFRCR SERPLBLD CKD-EPI 2021: 92 ML/MIN/1.73M2
EOSINOPHIL # BLD AUTO: 58 CELLS/UL (ref 15–500)
EOSINOPHIL NFR BLD AUTO: 0.8 %
ERYTHROCYTE [DISTWIDTH] IN BLOOD BY AUTOMATED COUNT: 13 % (ref 11–15)
GLUCOSE SERPL-MCNC: 79 MG/DL (ref 65–99)
GLUCOSE UR QL STRIP: NEGATIVE
HCT VFR BLD AUTO: 43.4 % (ref 35–45)
HGB BLD-MCNC: 13.9 G/DL (ref 11.7–15.5)
HGB UR QL STRIP: NEGATIVE
HYALINE CASTS #/AREA URNS LPF: ABNORMAL /LPF
KETONES UR QL STRIP: NEGATIVE
LEUKOCYTE ESTERASE UR QL STRIP: ABNORMAL
LIPASE SERPL-CCNC: 30 U/L (ref 7–60)
LYMPHOCYTES # BLD AUTO: 3672 CELLS/UL (ref 850–3900)
LYMPHOCYTES NFR BLD AUTO: 50.3 %
MCH RBC QN AUTO: 27.3 PG (ref 27–33)
MCHC RBC AUTO-ENTMCNC: 32 G/DL (ref 32–36)
MCV RBC AUTO: 85.3 FL (ref 80–100)
MONOCYTES # BLD AUTO: 380 CELLS/UL (ref 200–950)
MONOCYTES NFR BLD AUTO: 5.2 %
NEUTROPHILS # BLD AUTO: 3161 CELLS/UL (ref 1500–7800)
NEUTROPHILS NFR BLD AUTO: 43.3 %
NITRITE UR QL STRIP: NEGATIVE
PH UR STRIP: 5.5 [PH] (ref 5–8)
PLATELET # BLD AUTO: 267 THOUSAND/UL (ref 140–400)
PMV BLD REES-ECKER: 9.7 FL (ref 7.5–12.5)
POTASSIUM SERPL-SCNC: 4.4 MMOL/L (ref 3.5–5.3)
PROT SERPL-MCNC: 7.6 G/DL (ref 6.1–8.1)
PROT UR QL STRIP: NEGATIVE
RBC # BLD AUTO: 5.09 MILLION/UL (ref 3.8–5.1)
RBC #/AREA URNS HPF: ABNORMAL /HPF
SERVICE CMNT-IMP: ABNORMAL
SODIUM SERPL-SCNC: 137 MMOL/L (ref 135–146)
SP GR UR STRIP: 1.02 (ref 1–1.03)
SQUAMOUS #/AREA URNS HPF: ABNORMAL /HPF
WBC # BLD AUTO: 7.3 THOUSAND/UL (ref 3.8–10.8)
WBC #/AREA URNS HPF: ABNORMAL /HPF

## 2025-08-04 ENCOUNTER — HOSPITAL ENCOUNTER (OUTPATIENT)
Dept: RADIOLOGY | Facility: HOSPITAL | Age: 50
Discharge: HOME | End: 2025-08-04
Payer: MEDICARE

## 2025-08-04 VITALS — HEIGHT: 59 IN | WEIGHT: 172 LBS | BODY MASS INDEX: 34.68 KG/M2

## 2025-08-04 DIAGNOSIS — Z12.31 ENCOUNTER FOR SCREENING MAMMOGRAM FOR BREAST CANCER: ICD-10-CM

## 2025-08-04 PROCEDURE — 77067 SCR MAMMO BI INCL CAD: CPT

## 2025-08-04 PROCEDURE — 77063 BREAST TOMOSYNTHESIS BI: CPT | Performed by: RADIOLOGY

## 2025-08-04 PROCEDURE — 77067 SCR MAMMO BI INCL CAD: CPT | Performed by: RADIOLOGY

## 2025-08-07 ENCOUNTER — HOSPITAL ENCOUNTER (OUTPATIENT)
Dept: RADIOLOGY | Facility: HOSPITAL | Age: 50
Discharge: HOME | End: 2025-08-07
Payer: MEDICARE

## 2025-08-07 ENCOUNTER — TELEPHONE (OUTPATIENT)
Dept: PRIMARY CARE | Facility: CLINIC | Age: 50
End: 2025-08-07
Payer: MEDICARE

## 2025-08-07 DIAGNOSIS — R92.8 ABNORMAL MAMMOGRAM: ICD-10-CM

## 2025-08-07 DIAGNOSIS — R92.8 ABNORMAL MAMMOGRAM: Primary | ICD-10-CM

## 2025-08-07 PROCEDURE — 76642 ULTRASOUND BREAST LIMITED: CPT | Mod: LT

## 2025-08-07 PROCEDURE — 76642 ULTRASOUND BREAST LIMITED: CPT | Mod: LEFT SIDE | Performed by: RADIOLOGY

## 2025-08-07 PROCEDURE — 77065 DX MAMMO INCL CAD UNI: CPT | Mod: LEFT SIDE | Performed by: RADIOLOGY

## 2025-08-07 PROCEDURE — 76982 USE 1ST TARGET LESION: CPT | Mod: LT,RT

## 2025-08-07 PROCEDURE — 77061 BREAST TOMOSYNTHESIS UNI: CPT | Mod: LEFT SIDE | Performed by: RADIOLOGY

## 2025-08-07 PROCEDURE — 77061 BREAST TOMOSYNTHESIS UNI: CPT | Mod: LT

## 2025-08-07 NOTE — TELEPHONE ENCOUNTER
Abner at the [facility] [place] calling to request an order for diagnostic left breast mammogram per radiologist request.  Ordered per protocol